# Patient Record
Sex: FEMALE | Employment: FULL TIME | URBAN - METROPOLITAN AREA
[De-identification: names, ages, dates, MRNs, and addresses within clinical notes are randomized per-mention and may not be internally consistent; named-entity substitution may affect disease eponyms.]

---

## 2020-11-30 ENCOUNTER — TRANSCRIBE ORDERS (OUTPATIENT)
Dept: PHYSICAL THERAPY | Facility: CLINIC | Age: 50
End: 2020-11-30

## 2020-11-30 DIAGNOSIS — M25.561 ACUTE PAIN OF RIGHT KNEE: Primary | ICD-10-CM

## 2023-11-28 ENCOUNTER — TELEMEDICINE (OUTPATIENT)
Dept: FAMILY MEDICINE CLINIC | Facility: TELEHEALTH | Age: 53
End: 2023-11-28

## 2023-11-28 VITALS — TEMPERATURE: 102 F

## 2023-11-28 DIAGNOSIS — J40 BRONCHITIS: Primary | ICD-10-CM

## 2023-11-28 RX ORDER — DEXTROMETHORPHAN HYDROBROMIDE AND PROMETHAZINE HYDROCHLORIDE 15; 6.25 MG/5ML; MG/5ML
5 SYRUP ORAL 4 TIMES DAILY PRN
Qty: 118 ML | Refills: 0 | Status: SHIPPED | OUTPATIENT
Start: 2023-11-28

## 2023-11-28 RX ORDER — PREDNISONE 10 MG/1
TABLET ORAL DAILY
Qty: 21 EACH | Refills: 0 | Status: SHIPPED | OUTPATIENT
Start: 2023-11-28 | End: 2023-12-04

## 2023-11-28 RX ORDER — BENZONATATE 200 MG/1
200 CAPSULE ORAL 3 TIMES DAILY PRN
Qty: 30 CAPSULE | Refills: 0 | Status: SHIPPED | OUTPATIENT
Start: 2023-11-28

## 2023-11-28 NOTE — PROGRESS NOTES
You have chosen to receive care through a telehealth visit.  Do you consent to use a video/audio connection for your medical care today? Yes     HPI  Li Teague is a 53 y.o. female  presents with complaint of cough and wheezing with chest tightness. Her chest is huring when she coughs and her symptoms started Sunday and has gotten progressively worse. Cough is dry and non-productive. She has a wheezing with cugh. Her ear and sinuses are clear. Her throat is sore especially during cough. No illness exposure. She has had a fever of 102. She is taking Mucinex DM.     Review of Systems   Constitutional:  Positive for fatigue and fever.   HENT:  Positive for congestion, postnasal drip and rhinorrhea.    Respiratory:  Positive for cough, chest tightness, shortness of breath and wheezing. Negative for stridor.    Cardiovascular: Negative.    Musculoskeletal: Negative.    Hematological: Negative.    Psychiatric/Behavioral: Negative.         History reviewed. No pertinent past medical history.    History reviewed. No pertinent family history.    Social History     Socioeconomic History    Marital status: Unknown         Temp (!) 102 °F (38.9 °C)     PHYSICAL EXAM  Physical Exam   Constitutional: She is oriented to person, place, and time. She appears well-developed and well-nourished. She does not have a sickly appearance. She does not appear ill. No distress.   HENT:   Head: Normocephalic and atraumatic.   Pulmonary/Chest: Effort normal.  No respiratory distress.  Neurological: She is alert and oriented to person, place, and time.   Psychiatric: She has a normal mood and affect.   Vitals reviewed.      Diagnoses and all orders for this visit:    1. Bronchitis (Primary)  -     benzonatate (TESSALON) 200 MG capsule; Take 1 capsule by mouth 3 (Three) Times a Day As Needed for Cough.  Dispense: 30 capsule; Refill: 0  -     predniSONE (DELTASONE) 10 MG (21) dose pack; Take  by mouth Daily for 6 days.  Dispense: 21 each;  Refill: 0    Fluids and rest        FOLLOW-UP  As discussed during visit with [facility], if symptoms worsen or fail to improve, follow-up with PCP/Urgent Care/Emergency Department.    Patient verbalizes understanding of medications, instructions for treatment and follow-up.    Nuha Norman, APRN  11/28/2023  09:16 EST    The use of a video visit has been reviewed with the patient and verbal informed consent has been obtained. Myself and Li Teague participated in this visit. The patient is located in [facility], and I am located in [place], [place]. [employer] [facility] and [email or web address] were utilized.

## 2024-11-01 ENCOUNTER — OFFICE VISIT (OUTPATIENT)
Dept: FAMILY MEDICINE CLINIC | Facility: CLINIC | Age: 54
End: 2024-11-01
Payer: COMMERCIAL

## 2024-11-01 VITALS
SYSTOLIC BLOOD PRESSURE: 132 MMHG | WEIGHT: 233.7 LBS | DIASTOLIC BLOOD PRESSURE: 90 MMHG | BODY MASS INDEX: 39.9 KG/M2 | HEIGHT: 64 IN | OXYGEN SATURATION: 94 % | RESPIRATION RATE: 14 BRPM | HEART RATE: 78 BPM | TEMPERATURE: 98.6 F

## 2024-11-01 DIAGNOSIS — I10 HTN, GOAL BELOW 130/80: ICD-10-CM

## 2024-11-01 DIAGNOSIS — E03.9 HYPOTHYROIDISM, UNSPECIFIED TYPE: ICD-10-CM

## 2024-11-01 DIAGNOSIS — E11.65 TYPE 2 DIABETES MELLITUS WITH HYPERGLYCEMIA, WITHOUT LONG-TERM CURRENT USE OF INSULIN: ICD-10-CM

## 2024-11-01 DIAGNOSIS — R42 DIZZINESS: Primary | ICD-10-CM

## 2024-11-01 PROBLEM — G47.30 SLEEP APNEA: Status: ACTIVE | Noted: 2024-11-01

## 2024-11-01 PROBLEM — K21.9 GERD (GASTROESOPHAGEAL REFLUX DISEASE): Status: ACTIVE | Noted: 2024-11-01

## 2024-11-01 PROBLEM — M19.90 OSTEOARTHRITIS: Status: ACTIVE | Noted: 2024-11-01

## 2024-11-01 RX ORDER — ALPRAZOLAM 0.25 MG/1
0.25 TABLET, ORALLY DISINTEGRATING ORAL
COMMUNITY
Start: 2024-09-16

## 2024-11-01 RX ORDER — METFORMIN HYDROCHLORIDE 500 MG/1
500 TABLET, EXTENDED RELEASE ORAL
Qty: 30 TABLET | Refills: 3 | Status: SHIPPED | OUTPATIENT
Start: 2024-11-01

## 2024-11-01 RX ORDER — TIRZEPATIDE 5 MG/.5ML
5 INJECTION, SOLUTION SUBCUTANEOUS DAILY
Qty: 2 ML | Refills: 3 | Status: SHIPPED | OUTPATIENT
Start: 2024-11-01 | End: 2024-11-01

## 2024-11-01 RX ORDER — POTASSIUM CHLORIDE 1500 MG/1
20 TABLET, EXTENDED RELEASE ORAL
COMMUNITY
Start: 2024-08-14 | End: 2025-08-14

## 2024-11-01 RX ORDER — BISOPROLOL FUMARATE AND HYDROCHLOROTHIAZIDE 5; 6.25 MG/1; MG/1
1 TABLET ORAL DAILY
COMMUNITY

## 2024-11-01 RX ORDER — TIRZEPATIDE 5 MG/.5ML
5 INJECTION, SOLUTION SUBCUTANEOUS DAILY
COMMUNITY
End: 2024-11-01 | Stop reason: SDUPTHER

## 2024-11-01 RX ORDER — DESVENLAFAXINE 50 MG/1
1 TABLET, FILM COATED, EXTENDED RELEASE ORAL DAILY
COMMUNITY
Start: 2024-09-12

## 2024-11-01 RX ORDER — LEVOTHYROXINE SODIUM 50 UG/1
50 TABLET ORAL DAILY
COMMUNITY

## 2024-11-01 RX ORDER — ERGOCALCIFEROL 1.25 MG/1
1 CAPSULE, LIQUID FILLED ORAL WEEKLY
COMMUNITY
Start: 2024-09-13

## 2024-11-01 RX ORDER — TIRZEPATIDE 5 MG/.5ML
5 INJECTION, SOLUTION SUBCUTANEOUS WEEKLY
Qty: 2 ML | Refills: 3 | Status: SHIPPED | OUTPATIENT
Start: 2024-11-01 | End: 2024-11-04 | Stop reason: SDUPTHER

## 2024-11-01 NOTE — PROGRESS NOTES
Chief Complaint   Patient presents with    Establish Care     A1C 09/05/24 6.6    Prediabetes    Dizziness    Hypertension     History of Present Illness:  Patient presented to the clinic today to establish care with a new PCP.  Off boarding from [facility].  She has a history of recently diagnosed type 2 diabetes with elevated A1c of 6.6%, hypertension, hypothyroidism, sleep apnea and arthritis.      She reports dizziness especially upon standing from a sitting position or lying down position.  No reported low BP during those episodes.  No history of falls or syncope.  No recent medication changes.  No hearing problems or vertigo.    Reports blood pressure well-controlled on medication.  Was started on tirzepatide for diabetes control but encountered some financial constraint due to lack of insurance coverage as previous provider was out of network.  Will like to try Mounjaro again, currently on 5 mg weekly and is tolerating.    Outpatient Medications Prior to Visit   Medication Sig Dispense Refill    ALPRAZolam (NIRAVAM) 0.25 MG disintegrating tablet Take 1 tablet by mouth.      bisoprolol-hydrochlorothiazide (ZIAC) 5-6.25 MG per tablet Take 1 tablet by mouth Daily.      desvenlafaxine (PRISTIQ) 50 MG 24 hr tablet Take 1 tablet by mouth Daily.      levothyroxine (SYNTHROID, LEVOTHROID) 50 MCG tablet Take 1 tablet by mouth Daily.      potassium chloride (KLOR-CON M20) 20 MEQ CR tablet Take 1 tablet by mouth.      vitamin D (ERGOCALCIFEROL) 1.25 MG (81451 UT) capsule capsule Take 1 capsule by mouth 1 (One) Time Per Week.      benzonatate (TESSALON) 200 MG capsule Take 1 capsule by mouth 3 (Three) Times a Day As Needed for Cough. (Patient not taking: Reported on 11/1/2024) 30 capsule 0    promethazine-dextromethorphan (PROMETHAZINE-DM) 6.25-15 MG/5ML syrup Take 5 mL by mouth 4 (Four) Times a Day As Needed for Cough. (Patient not taking: Reported on 11/1/2024) 118 mL 0    Tirzepatide (MOUNJARO SC)  "Inject 5 mg under the skin into the appropriate area as directed. (Patient not taking: Reported on 11/1/2024)      Tirzepatide (Mounjaro) 5 MG/0.5ML solution auto-injector Inject 5 mg under the skin into the appropriate area as directed Daily. Indications: Type 2 Diabetes       No facility-administered medications prior to visit.      Allergies   Allergen Reactions    Latex Rash     Other reaction(s): Rash    Tape Rash     allergic to plastic tape per pt    Zinc Other (See Comments)    Adhesive Tape Rash     Past Surgical History:   Procedure Laterality Date    CHOLECYSTECTOMY      ENDOMETRIAL ABLATION  2013    GASTRIC SLEEVE LAPAROSCOPIC      LAPAROSCOPIC TUBAL LIGATION      LEG SURGERY       family history includes Diabetes in her father, maternal grandmother, mother, paternal grandmother, and sister; Hypertension in her mother and sister; Pancreatic cancer in her brother and father; Thyroid disease in her mother and sister.   reports that she has quit smoking. Her smoking use included cigarettes. She started smoking about 34 years ago. She has a 34.8 pack-year smoking history. She has been exposed to tobacco smoke. She has never used smokeless tobacco. She reports current alcohol use. She reports that she does not use drugs.     /90   Pulse 78   Temp 98.6 °F (37 °C) (Temporal)   Resp 14   Ht 162.6 cm (64\")   Wt 106 kg (233 lb 11.2 oz)   LMP 02/05/2024 (Approximate)   SpO2 94%   BMI 40.11 kg/m²   Physical Exam  Constitutional:       Appearance: Normal appearance.   HENT:      Head: Normocephalic and atraumatic.   Cardiovascular:      Rate and Rhythm: Normal rate and regular rhythm.      Heart sounds: Normal heart sounds.   Pulmonary:      Breath sounds: Normal breath sounds.   Abdominal:      General: Bowel sounds are normal.      Palpations: Abdomen is soft.   Neurological:      Mental Status: She is alert and oriented to person, place, and time.   Psychiatric:         Mood and Affect: Mood normal. "          The following data was reviewed by: Geneva Patino MD on 11/01/2024:  Common labs          12/15/2023    09:44 4/22/2024    10:03 9/5/2024    16:07   Common Labs   WBC 6.04     5.13     5.67       Hemoglobin 10.9     11.6     11.6       Hematocrit 36.2     38.7     37.9       Platelets 338     314     331       Hemoglobin A1C 6.4      6.6          Details          This result is from an external source.                  Diagnoses and all orders for this visit:    1. Dizziness (Primary)  -     Ambulatory Referral to Physical Therapy for Evaluation & Treatment    2. HTN, goal below 130/80  Assessment & Plan:  Hypertension is stable and controlled  Continue current treatment regimen.  Dietary sodium restriction.  Weight loss.  Regular aerobic exercise.  Ambulatory blood pressure monitoring.  Blood pressure will be reassessed in 3 months.      3. Type 2 diabetes mellitus with hyperglycemia, without long-term current use of insulin  Assessment & Plan:  Diabetes is newly identified.   Will start her on metformin 500 mg daily  To resume Mounjaro 5 mg weekly  Medication changes per orders.  Recommended an ADA diet.  Regular aerobic exercise.  Discussed foot care.  Reminded to get yearly retinal exam.  Diabetes will be reassessed in 3 months    Orders:  -     Discontinue: metFORMIN (GLUCOPHAGE) 500 MG tablet; Take 1 tablet by mouth Daily With Breakfast.  Dispense: 30 tablet; Refill: 3  -     Discontinue: Tirzepatide (Mounjaro) 5 MG/0.5ML solution auto-injector; Inject 5 mg under the skin into the appropriate area as directed 1 (One) Time Per Week.  Dispense: 2 mL; Refill: 3  -     Discontinue: metFORMIN (GLUCOPHAGE) 500 MG tablet; Take 1 tablet by mouth Daily With Breakfast.  Dispense: 30 tablet; Refill: 3  -     Discontinue: Tirzepatide (Mounjaro) 5 MG/0.5ML solution auto-injector; Inject 5 mg under the skin into the appropriate area as directed 1 (One) Time Per Week.  Dispense: 2 mL; Refill: 3  -      Discontinue: Tirzepatide (Mounjaro) 5 MG/0.5ML solution auto-injector; Inject 5 mg under the skin into the appropriate area as directed 1 (One) Time Per Week.  Dispense: 2 mL; Refill: 3  -     Discontinue: Tirzepatide (Mounjaro) 5 MG/0.5ML solution auto-injector; Inject 5 mg under the skin into the appropriate area as directed Daily. Indications: Type 2 Diabetes  Dispense: 2 mL; Refill: 3  -     Discontinue: metFORMIN (GLUCOPHAGE) 500 MG tablet; Take 1 tablet by mouth Daily With Breakfast.  Dispense: 30 tablet; Refill: 3  -     Tirzepatide (Mounjaro) 5 MG/0.5ML solution auto-injector; Inject 5 mg under the skin into the appropriate area as directed 1 (One) Time Per Week. Indications: Type 2 Diabetes  Dispense: 2 mL; Refill: 3  -     Discontinue: metFORMIN (GLUCOPHAGE) 500 MG tablet; Take 1 tablet by mouth Daily With Breakfast.  Dispense: 30 tablet; Refill: 3  -     Discontinue: metFORMIN (GLUCOPHAGE) 500 MG tablet; Take 1 tablet by mouth Daily With Breakfast.  Dispense: 30 tablet; Refill: 3  -     metFORMIN ER (GLUCOPHAGE-XR) 500 MG 24 hr tablet; Take 1 tablet by mouth Daily With Breakfast.  Dispense: 30 tablet; Refill: 3  -     Basic Metabolic Panel; Future  -     Hemoglobin A1c; Future  -     Microalbumin / Creatinine Urine Ratio - Urine, Clean Catch; Future    4. Hypothyroidism, unspecified type  Assessment & Plan:  Normal TSH [0.65]  Continue on levothyroxine 50 mcg daily  To repeat TSH in 6 months               Return in about 3 months (around 2/1/2025) for Recheck with labs .

## 2024-11-02 PROBLEM — E11.65 TYPE 2 DIABETES MELLITUS WITH HYPERGLYCEMIA, WITHOUT LONG-TERM CURRENT USE OF INSULIN: Status: ACTIVE | Noted: 2024-11-02

## 2024-11-02 NOTE — ASSESSMENT & PLAN NOTE
Diabetes is newly identified.   Will start her on metformin 500 mg daily  To resume Mounjaro 5 mg weekly  Medication changes per orders.  Recommended an ADA diet.  Regular aerobic exercise.  Discussed foot care.  Reminded to get yearly retinal exam.  Diabetes will be reassessed in 3 months

## 2024-11-04 ENCOUNTER — DOCUMENTATION (OUTPATIENT)
Dept: FAMILY MEDICINE CLINIC | Facility: CLINIC | Age: 54
End: 2024-11-04
Payer: COMMERCIAL

## 2024-11-04 ENCOUNTER — PATIENT MESSAGE (OUTPATIENT)
Dept: FAMILY MEDICINE CLINIC | Facility: CLINIC | Age: 54
End: 2024-11-04
Payer: COMMERCIAL

## 2024-11-04 DIAGNOSIS — E11.65 TYPE 2 DIABETES MELLITUS WITH HYPERGLYCEMIA, WITHOUT LONG-TERM CURRENT USE OF INSULIN: ICD-10-CM

## 2024-11-04 RX ORDER — TIRZEPATIDE 5 MG/.5ML
5 INJECTION, SOLUTION SUBCUTANEOUS WEEKLY
Qty: 2 ML | Refills: 3 | Status: SHIPPED | OUTPATIENT
Start: 2024-11-04

## 2025-01-17 ENCOUNTER — LAB (OUTPATIENT)
Dept: FAMILY MEDICINE CLINIC | Facility: CLINIC | Age: 55
End: 2025-01-17
Payer: COMMERCIAL

## 2025-01-17 DIAGNOSIS — E11.65 TYPE 2 DIABETES MELLITUS WITH HYPERGLYCEMIA, WITHOUT LONG-TERM CURRENT USE OF INSULIN: ICD-10-CM

## 2025-01-18 LAB
ALBUMIN/CREAT UR: 6 MG/G CREAT (ref 0–29)
BUN SERPL-MCNC: 9 MG/DL (ref 6–20)
BUN/CREAT SERPL: 8.9 (ref 7–25)
CALCIUM SERPL-MCNC: 9.5 MG/DL (ref 8.6–10.5)
CHLORIDE SERPL-SCNC: 101 MMOL/L (ref 98–107)
CO2 SERPL-SCNC: 28.1 MMOL/L (ref 22–29)
CREAT SERPL-MCNC: 1.01 MG/DL (ref 0.57–1)
CREAT UR-MCNC: 440.9 MG/DL
EGFRCR SERPLBLD CKD-EPI 2021: 66.7 ML/MIN/1.73
GLUCOSE SERPL-MCNC: 95 MG/DL (ref 65–99)
HBA1C MFR BLD: 5.4 % (ref 4.8–5.6)
MICROALBUMIN UR-MCNC: 25 UG/ML
POTASSIUM SERPL-SCNC: 4 MMOL/L (ref 3.5–5.2)
SODIUM SERPL-SCNC: 139 MMOL/L (ref 136–145)

## 2025-01-24 ENCOUNTER — OFFICE VISIT (OUTPATIENT)
Dept: FAMILY MEDICINE CLINIC | Facility: CLINIC | Age: 55
End: 2025-01-24
Payer: COMMERCIAL

## 2025-01-24 VITALS
HEART RATE: 83 BPM | HEIGHT: 64 IN | WEIGHT: 211.7 LBS | SYSTOLIC BLOOD PRESSURE: 100 MMHG | TEMPERATURE: 97.3 F | DIASTOLIC BLOOD PRESSURE: 76 MMHG | OXYGEN SATURATION: 96 % | RESPIRATION RATE: 18 BRPM | BODY MASS INDEX: 36.14 KG/M2

## 2025-01-24 DIAGNOSIS — Z11.59 ENCOUNTER FOR HEPATITIS C SCREENING TEST FOR LOW RISK PATIENT: ICD-10-CM

## 2025-01-24 DIAGNOSIS — Z12.11 SCREEN FOR COLON CANCER: ICD-10-CM

## 2025-01-24 DIAGNOSIS — E11.65 TYPE 2 DIABETES MELLITUS WITH HYPERGLYCEMIA, WITHOUT LONG-TERM CURRENT USE OF INSULIN: ICD-10-CM

## 2025-01-24 DIAGNOSIS — E03.9 HYPOTHYROIDISM, UNSPECIFIED TYPE: ICD-10-CM

## 2025-01-24 DIAGNOSIS — I10 HTN, GOAL BELOW 130/80: Primary | ICD-10-CM

## 2025-01-24 DIAGNOSIS — Z00.00 ANNUAL PHYSICAL EXAM: ICD-10-CM

## 2025-01-24 PROCEDURE — 99214 OFFICE O/P EST MOD 30 MIN: CPT | Performed by: INTERNAL MEDICINE

## 2025-01-24 RX ORDER — ALBUTEROL SULFATE 90 UG/1
2 INHALANT RESPIRATORY (INHALATION)
COMMUNITY
Start: 2024-12-27

## 2025-01-24 NOTE — PROGRESS NOTES
Chief Complaint   Patient presents with    Follow-up     Pt here for routine follow up, been constipated lately    Hypertension    Hypothyroidism    Sleep Apnea    Constipation      History of Present Illness:  Patient is here for follow-up of hypertension & T2DM.  She reports to be doing well overall, no new complaint today, reports dizziness has slightly improved after PT. BP well-controlled medication, averaging in the 90s - 100s systolic in 70s to 80s diastolic.  She has improved in her diet, eating more healthier options and less concentrated sweets.  A1c has significantly improved from 6.6 to 5.4%.  Has remained compliant on medication, tolerating tirzepatide 5 mg weekly.  Has lost about 20 pounds in the past 3 months.    PMH:   Outpatient Medications Prior to Visit   Medication Sig Dispense Refill    albuterol sulfate  (90 Base) MCG/ACT inhaler Inhale 2 puffs.      ALPRAZolam (NIRAVAM) 0.25 MG disintegrating tablet Take 1 tablet by mouth.      bisoprolol-hydrochlorothiazide (ZIAC) 5-6.25 MG per tablet Take 1 tablet by mouth Daily.      desvenlafaxine (PRISTIQ) 50 MG 24 hr tablet Take 1 tablet by mouth Daily.      levothyroxine (SYNTHROID, LEVOTHROID) 50 MCG tablet Take 1 tablet by mouth Daily.      metFORMIN ER (GLUCOPHAGE-XR) 500 MG 24 hr tablet Take 1 tablet by mouth Daily With Breakfast. 30 tablet 3    potassium chloride (KLOR-CON M20) 20 MEQ CR tablet Take 1 tablet by mouth.      Tirzepatide (Mounjaro) 5 MG/0.5ML solution auto-injector Inject 5 mg under the skin into the appropriate area as directed 1 (One) Time Per Week. Indications: Type 2 Diabetes 2 mL 3    vitamin D (ERGOCALCIFEROL) 1.25 MG (72513 UT) capsule capsule Take 1 capsule by mouth 1 (One) Time Per Week.       No facility-administered medications prior to visit.      Allergies   Allergen Reactions    Latex Rash     Other reaction(s): Rash    Tape Rash     allergic to plastic tape per pt    Zinc Other (See Comments)    Adhesive Tape  "Rash     Past Surgical History:   Procedure Laterality Date    CHOLECYSTECTOMY      ENDOMETRIAL ABLATION  2013    GASTRIC SLEEVE LAPAROSCOPIC      LAPAROSCOPIC TUBAL LIGATION      LEG SURGERY       family history includes Diabetes in her father, maternal grandmother, mother, paternal grandmother, and sister; Hypertension in her mother and sister; Pancreatic cancer in her brother and father; Thyroid disease in her mother and sister.   reports that she quit smoking about 19 years ago. Her smoking use included cigarettes. She started smoking about 35 years ago. She has a 15.8 pack-year smoking history. She has been exposed to tobacco smoke. She has never used smokeless tobacco. She reports current alcohol use. She reports that she does not use drugs.     /76 (BP Location: Right arm, Patient Position: Sitting, Cuff Size: Adult)   Pulse 83   Temp 97.3 °F (36.3 °C) (Temporal)   Resp 18   Ht 162.6 cm (64.02\")   Wt 96 kg (211 lb 11.2 oz)   SpO2 96%   BMI 36.32 kg/m²   Physical Exam  Constitutional:       Appearance: Normal appearance.   HENT:      Head: Normocephalic and atraumatic.   Cardiovascular:      Rate and Rhythm: Normal rate and regular rhythm.      Heart sounds: Normal heart sounds.   Pulmonary:      Breath sounds: Normal breath sounds.   Abdominal:      General: Bowel sounds are normal.      Palpations: Abdomen is soft.   Neurological:      Mental Status: She is alert and oriented to person, place, and time.          The following data was reviewed by: Geneva Patino MD on 01/24/2025:  Common labs          4/22/2024    10:03 9/5/2024    16:07 1/17/2025    10:59   Common Labs   Glucose   95    BUN   9    Creatinine   1.01    Sodium   139    Potassium   4.0    Chloride   101    Calcium   9.5    WBC 5.13     5.67        Hemoglobin 11.6     11.6        Hematocrit 38.7     37.9        Platelets 314     331        Hemoglobin A1C  6.6     5.40    Microalbumin, Urine   25.0       Details          This " result is from an external source.                  Diagnoses and all orders for this visit:    1. HTN, goal below 130/80 (Primary)  Assessment & Plan:  Hypertension is stable and controlled  Continue current treatment regimen.  Blood pressure will be reassessed in 5 months.    Orders:  -     CBC Auto Differential; Future  -     Comprehensive Metabolic Panel; Future    2. Type 2 diabetes mellitus with hyperglycemia, without long-term current use of insulin  Assessment & Plan:  Diabetes is improving with treatment and lifestyle modifications  Normal urine microalbumin  To continue on metformin and tirzepatide  Continue current treatment regimen.  Recommended an ADA diet.  Regular aerobic exercise.  Discussed foot care.  Reminded to get yearly retinal exam.  Diabetes will be reassessed  in 5 months    Orders:  -     Hemoglobin A1c; Future    3. Screen for colon cancer  -     Cologuard - Stool, Per Rectum; Future    4. Annual physical exam  -     CBC Auto Differential; Future  -     Comprehensive Metabolic Panel; Future  -     Hepatitis C Antibody; Future  -     Lipid Panel With / Chol / HDL Ratio; Future  -     Urinalysis With Microscopic - Urine, Clean Catch; Future  -     Vitamin D,25-Hydroxy; Future  -     TSH+Free T4; Future  -     Hemoglobin A1c; Future    5. Encounter for hepatitis C screening test for low risk patient  -     Hepatitis C Antibody; Future    6. Hypothyroidism, unspecified type  -     TSH+Free T4; Future             Return in about 5 months (around 6/24/2025) for Follow up with fasting labs.

## 2025-01-24 NOTE — ASSESSMENT & PLAN NOTE
Hypertension is stable and controlled  Continue current treatment regimen.  Blood pressure will be reassessed in 5 months.

## 2025-01-24 NOTE — ASSESSMENT & PLAN NOTE
Diabetes is improving with treatment and lifestyle modifications  Normal urine microalbumin  To continue on metformin and tirzepatide  Continue current treatment regimen.  Recommended an ADA diet.  Regular aerobic exercise.  Discussed foot care.  Reminded to get yearly retinal exam.  Diabetes will be reassessed  in 5 months